# Patient Record
Sex: MALE | Race: WHITE | HISPANIC OR LATINO | URBAN - METROPOLITAN AREA
[De-identification: names, ages, dates, MRNs, and addresses within clinical notes are randomized per-mention and may not be internally consistent; named-entity substitution may affect disease eponyms.]

---

## 2022-04-01 ENCOUNTER — APPOINTMENT (RX ONLY)
Dept: URBAN - METROPOLITAN AREA CLINIC 129 | Facility: CLINIC | Age: 22
Setting detail: DERMATOLOGY
End: 2022-04-01

## 2022-04-01 DIAGNOSIS — L05.91 PILONIDAL CYST WITHOUT ABSCESS: ICD-10-CM

## 2022-04-01 PROBLEM — Z79899 LONG-TERM (CURRENT) USE OF OTHER MEDICATIONS: Status: ACTIVE | Noted: 2022-04-01

## 2022-04-01 PROBLEM — L701 OTHER ACNE: Status: ACTIVE | Noted: 2022-04-01

## 2022-04-01 PROBLEM — L708 OTHER ACNE: Status: ACTIVE | Noted: 2022-04-01

## 2022-04-01 PROBLEM — L700 OTHER ACNE: Status: ACTIVE | Noted: 2022-04-01

## 2022-04-01 PROBLEM — Z7984 LONG-TERM (CURRENT) USE OF OTHER MEDICATIONS: Status: ACTIVE | Noted: 2022-04-01

## 2022-04-01 PROBLEM — Z79891 LONG-TERM (CURRENT) USE OF OTHER MEDICATIONS: Status: ACTIVE | Noted: 2022-04-01

## 2022-04-01 PROCEDURE — ? ADDITIONAL NOTES

## 2022-04-01 PROCEDURE — ? COUNSELING

## 2022-04-01 PROCEDURE — 99202 OFFICE O/P NEW SF 15 MIN: CPT

## 2022-04-01 ASSESSMENT — LOCATION ZONE DERM: LOCATION ZONE: TRUNK

## 2022-04-01 ASSESSMENT — LOCATION SIMPLE DESCRIPTION DERM: LOCATION SIMPLE: GLUTEAL CLEFT

## 2022-04-01 ASSESSMENT — LOCATION DETAILED DESCRIPTION DERM: LOCATION DETAILED: GLUTEAL CLEFT

## 2022-04-01 NOTE — PROCEDURE: ADDITIONAL NOTES
Additional Notes: Patient was recommended to consult a general surgeon
Detail Level: Simple
Render Risk Assessment In Note?: no